# Patient Record
Sex: FEMALE | Race: OTHER | Employment: OTHER | URBAN - METROPOLITAN AREA
[De-identification: names, ages, dates, MRNs, and addresses within clinical notes are randomized per-mention and may not be internally consistent; named-entity substitution may affect disease eponyms.]

---

## 2017-04-05 ENCOUNTER — HOSPITAL ENCOUNTER (OUTPATIENT)
Dept: RADIOLOGY | Facility: MEDICAL CENTER | Age: 67
End: 2017-04-05
Attending: OBSTETRICS & GYNECOLOGY
Payer: COMMERCIAL

## 2017-04-05 DIAGNOSIS — Z13.9 SCREENING: ICD-10-CM

## 2017-04-05 PROCEDURE — G0202 SCR MAMMO BI INCL CAD: HCPCS

## 2017-05-04 ENCOUNTER — TELEPHONE (OUTPATIENT)
Dept: RADIOLOGY | Facility: MEDICAL CENTER | Age: 67
End: 2017-05-04

## 2017-05-04 ENCOUNTER — HOSPITAL ENCOUNTER (OUTPATIENT)
Dept: RADIOLOGY | Facility: MEDICAL CENTER | Age: 67
End: 2017-05-04
Attending: OBSTETRICS & GYNECOLOGY
Payer: COMMERCIAL

## 2017-05-04 DIAGNOSIS — R92.8 ABNORMAL MAMMOGRAM OF RIGHT BREAST: ICD-10-CM

## 2017-05-04 PROCEDURE — 76642 ULTRASOUND BREAST LIMITED: CPT | Mod: RT

## 2017-05-04 PROCEDURE — G0206 DX MAMMO INCL CAD UNI: HCPCS | Mod: RT

## 2017-05-04 NOTE — TELEPHONE ENCOUNTER
Spoke to Erika from [facility]'s office who stated she authorizes a breast ultrasound order and will fax an updated order/jls

## 2018-04-10 ENCOUNTER — HOSPITAL ENCOUNTER (OUTPATIENT)
Dept: RADIOLOGY | Facility: MEDICAL CENTER | Age: 68
End: 2018-04-10
Attending: OBSTETRICS & GYNECOLOGY
Payer: MEDICARE

## 2018-04-10 DIAGNOSIS — Z01.419 ENCOUNTER FOR GYNECOLOGICAL EXAMINATION WITHOUT ABNORMAL FINDING: ICD-10-CM

## 2018-04-10 DIAGNOSIS — Z12.31 VISIT FOR SCREENING MAMMOGRAM: ICD-10-CM

## 2018-04-10 PROCEDURE — 77063 BREAST TOMOSYNTHESIS BI: CPT

## 2018-04-10 PROCEDURE — 77080 DXA BONE DENSITY AXIAL: CPT

## 2019-04-12 ENCOUNTER — HOSPITAL ENCOUNTER (OUTPATIENT)
Dept: RADIOLOGY | Facility: MEDICAL CENTER | Age: 69
End: 2019-04-12
Attending: OBSTETRICS & GYNECOLOGY
Payer: MEDICARE

## 2019-04-12 DIAGNOSIS — Z12.31 VISIT FOR SCREENING MAMMOGRAM: ICD-10-CM

## 2019-04-12 PROCEDURE — 77063 BREAST TOMOSYNTHESIS BI: CPT

## 2019-10-20 ENCOUNTER — OFFICE VISIT (OUTPATIENT)
Dept: URGENT CARE | Facility: PHYSICIAN GROUP | Age: 69
End: 2019-10-20
Payer: MEDICARE

## 2019-10-20 VITALS
HEIGHT: 65 IN | SYSTOLIC BLOOD PRESSURE: 138 MMHG | OXYGEN SATURATION: 96 % | WEIGHT: 142 LBS | HEART RATE: 88 BPM | BODY MASS INDEX: 23.66 KG/M2 | TEMPERATURE: 97 F | RESPIRATION RATE: 18 BRPM | DIASTOLIC BLOOD PRESSURE: 80 MMHG

## 2019-10-20 DIAGNOSIS — R05.9 COUGH: ICD-10-CM

## 2019-10-20 DIAGNOSIS — J98.8 RTI (RESPIRATORY TRACT INFECTION): ICD-10-CM

## 2019-10-20 PROCEDURE — 99203 OFFICE O/P NEW LOW 30 MIN: CPT | Performed by: PHYSICIAN ASSISTANT

## 2019-10-20 RX ORDER — BLOOD SUGAR DIAGNOSTIC, DRUM
STRIP MISCELLANEOUS
Refills: 3 | COMMUNITY
Start: 2019-09-11

## 2019-10-20 RX ORDER — LANCETS
EACH MISCELLANEOUS
Refills: 3 | COMMUNITY
Start: 2019-09-28

## 2019-10-20 RX ORDER — ASPIRIN 81 MG/1
81 TABLET, CHEWABLE ORAL DAILY
COMMUNITY

## 2019-10-20 RX ORDER — LEVOTHYROXINE SODIUM 112 MCG
TABLET ORAL
COMMUNITY
Start: 2019-08-19

## 2019-10-20 RX ORDER — ALENDRONATE SODIUM 70 MG/1
TABLET, EFFERVESCENT ORAL
COMMUNITY
Start: 2019-10-01

## 2019-10-20 RX ORDER — ROSUVASTATIN CALCIUM 5 MG/1
5 TABLET, COATED ORAL
Refills: 3 | COMMUNITY
Start: 2019-09-28

## 2019-10-20 RX ORDER — DOXYCYCLINE HYCLATE 100 MG
100 TABLET ORAL 2 TIMES DAILY
Qty: 14 TAB | Refills: 0 | Status: SHIPPED | OUTPATIENT
Start: 2019-10-20 | End: 2019-10-27

## 2019-10-20 RX ORDER — BENZONATATE 100 MG/1
100 CAPSULE ORAL 3 TIMES DAILY PRN
Qty: 30 CAP | Refills: 0 | Status: SHIPPED | OUTPATIENT
Start: 2019-10-20 | End: 2023-08-19

## 2019-10-20 RX ORDER — GLIMEPIRIDE 4 MG/1
4 TABLET ORAL EVERY MORNING
Refills: 3 | COMMUNITY
Start: 2019-09-28

## 2019-10-20 RX ORDER — LISINOPRIL 10 MG/1
10 TABLET ORAL
Refills: 3 | COMMUNITY
Start: 2019-09-14

## 2019-10-20 ASSESSMENT — ENCOUNTER SYMPTOMS
WHEEZING: 0
NAUSEA: 0
FEVER: 0
HEMOPTYSIS: 0
SORE THROAT: 1
VOMITING: 0
SHORTNESS OF BREATH: 0
CHILLS: 0
COUGH: 1

## 2019-10-20 ASSESSMENT — COPD QUESTIONNAIRES: COPD: 0

## 2019-10-20 NOTE — PROGRESS NOTES
Subjective:   Arin Lee is a 69 y.o. female who presents for Cough (runny nose, L ear [id number] )        Gets similar sx this time every year. Typically sees PCP and requires abx therapy. Unable to be seen by PCPdue to scheduling conflict.    Cough   This is a new problem. The current episode started in the past 7 days. The problem has been gradually worsening. The problem occurs constantly. The cough is non-productive. Associated symptoms include ear congestion, nasal congestion, postnasal drip and a sore throat. Pertinent negatives include no chills, fever, hemoptysis, shortness of breath or wheezing. Exacerbated by: nighttime. She has tried OTC cough suppressant (tylenol, robitussin) for the symptoms. Her past medical history is significant for bronchitis. There is no history of asthma, COPD, emphysema, environmental allergies or pneumonia.     Review of Systems   Constitutional: Negative for chills and fever.   HENT: Positive for postnasal drip and sore throat.    Respiratory: Positive for cough. Negative for hemoptysis, shortness of breath and wheezing.    Gastrointestinal: Negative for nausea and vomiting.   Endo/Heme/Allergies: Negative for environmental allergies.       PMH:  has no past medical history of Breast cancer (HCC).  MEDS:   Current Outpatient Medications:   •  lisinopril (PRINIVIL) 10 MG Tab, Take 10 mg by mouth every day., Disp: , Rfl: 3  •  glimepiride (AMARYL) 4 MG Tab, Take 4 mg by mouth every morning., Disp: , Rfl: 3  •  metformin (GLUCOPHAGE) 1000 MG tablet, Take 1,000 mg by mouth., Disp: , Rfl: 11  •  rosuvastatin (CRESTOR) 5 MG Tab, Take 5 mg by mouth every day., Disp: , Rfl: 3  •  SYNTHROID 112 MCG Tab, , Disp: , Rfl:   •  BINOSTO 70 MG Effer Tab, , Disp: , Rfl:   •  ACCU-CHEK FASTCLIX LANCETS Misc, USE DAILY TO CHECK BLOOD SUGARS. E11.9, Disp: , Rfl: 3  •  NOVOTWIST 32G X 5 MM Misc, USE DAILY WITH VICTOZA, Disp: , Rfl: 6  •  ACCU-CHEK COMPACT PLUS strip, TEST BLOOD  "SUGAR ONCE DAILY E11.9, Disp: , Rfl: 3  •  aspirin (ASA) 81 MG Chew Tab chewable tablet, Take 81 mg by mouth every day., Disp: , Rfl:   •  liraglutide (VICTOZA) 18 MG/3ML Solution Pen-injector, Inject 0.6 mg as instructed every day., Disp: , Rfl:   •  vitamin D (CHOLECALCIFEROL) 1000 UNIT Tab, Take 1,000 Units by mouth every day., Disp: , Rfl:   •  Calcium Carbonate-Vit D-Min (CALCIUM 1200 PO), Take  by mouth., Disp: , Rfl:   •  doxycycline (VIBRAMYCIN) 100 MG Tab, Take 1 Tab by mouth 2 times a day for 7 days., Disp: 14 Tab, Rfl: 0  •  benzonatate (TESSALON) 100 MG Cap, Take 1 Cap by mouth 3 times a day as needed., Disp: 30 Cap, Rfl: 0  •  naproxen (NAPROSYN) 250 MG TABS, Take 1 Tab by mouth 2 times daily with meals as needed. (Patient not taking: Reported on 10/20/2019), Disp: 30 Each, Rfl: 0  ALLERGIES: No Known Allergies  SURGHX: History reviewed. No pertinent surgical history.  SOCHX:  reports that she has never smoked. She has never used smokeless tobacco. She reports that she drank alcohol.  FH: Family history was reviewed, no pertinent findings to report   Objective:   /80   Pulse 88   Temp 36.1 °C (97 °F) (Temporal)   Resp 18   Ht 1.651 m (5' 5\")   Wt 64.4 kg (142 lb)   SpO2 96%   BMI 23.63 kg/m²   Physical Exam   Constitutional: She is oriented to person, place, and time. She appears well-developed and well-nourished.  Non-toxic appearance. No distress.   HENT:   Head: Normocephalic and atraumatic.   Right Ear: Hearing, external ear and ear canal normal. Tympanic membrane is injected (mild).   Left Ear: Hearing, external ear and ear canal normal.   Nose: Mucosal edema and rhinorrhea present. Right sinus exhibits no maxillary sinus tenderness. Left sinus exhibits no maxillary sinus tenderness.   Mouth/Throat: Uvula is midline, oropharynx is clear and moist and mucous membranes are normal.   Complete left sided cerumen impaction. Significant nasal congestion. Frequent dry cough.   Eyes: " Conjunctivae and lids are normal.   Neck: Neck supple.   Cardiovascular: Normal rate, regular rhythm, S1 normal, S2 normal and normal heart sounds. Exam reveals no gallop and no friction rub.   No murmur heard.  Pulmonary/Chest: Effort normal and breath sounds normal. No respiratory distress. She has no decreased breath sounds. She has no wheezes. She has no rhonchi. She has no rales.   Abdominal: Normal appearance.   Musculoskeletal:   Normal range of motion. Exhibits no edema and no tenderness.    Neurological: She is alert and oriented to person, place, and time. No cranial nerve deficit or sensory deficit.   Skin: Skin is warm, dry and intact. Capillary refill takes less than 2 seconds.   Psychiatric: She has a normal mood and affect. Her speech is normal and behavior is normal. Judgment and thought content normal. Cognition and memory are normal.   Vitals reviewed.        Assessment/Plan:   1. RTI (respiratory tract infection)  - doxycycline (VIBRAMYCIN) 100 MG Tab; Take 1 Tab by mouth 2 times a day for 7 days.  Dispense: 14 Tab; Refill: 0  - benzonatate (TESSALON) 100 MG Cap; Take 1 Cap by mouth 3 times a day as needed.  Dispense: 30 Cap; Refill: 0    2. Cough  - doxycycline (VIBRAMYCIN) 100 MG Tab; Take 1 Tab by mouth 2 times a day for 7 days.  Dispense: 14 Tab; Refill: 0  - benzonatate (TESSALON) 100 MG Cap; Take 1 Cap by mouth 3 times a day as needed.  Dispense: 30 Cap; Refill: 0    Other orders  - lisinopril (PRINIVIL) 10 MG Tab; Take 10 mg by mouth every day.; Refill: 3  - glimepiride (AMARYL) 4 MG Tab; Take 4 mg by mouth every morning.; Refill: 3  - metformin (GLUCOPHAGE) 1000 MG tablet; Take 1,000 mg by mouth.; Refill: 11  - rosuvastatin (CRESTOR) 5 MG Tab; Take 5 mg by mouth every day.; Refill: 3  - SYNTHROID 112 MCG Tab  - BINOSTO 70 MG Effer Tab  - ACCU-CHEK FASTCLIX LANCETS Misc; USE DAILY TO CHECK BLOOD SUGARS. E11.9; Refill: 3  - NOVOTWIST 32G X 5 MM Misc; USE DAILY WITH VICTOZA; Refill: 6  -  ACCU-CHEK COMPACT PLUS strip; TEST BLOOD SUGAR ONCE DAILY E11.9; Refill: 3  - aspirin (ASA) 81 MG Chew Tab chewable tablet; Take 81 mg by mouth every day.  - liraglutide (VICTOZA) 18 MG/3ML Solution Pen-injector; Inject 0.6 mg as instructed every day.  - vitamin D (CHOLECALCIFEROL) 1000 UNIT Tab; Take 1,000 Units by mouth every day.  - Calcium Carbonate-Vit D-Min (CALCIUM 1200 PO); Take  by mouth.    Pt instructed to complete full course of medication despite symptomatic improvement. Pt to take med with meals to alleviate GI upset. Pt to take a probiotic or eat Jocelin’s yogurt/ kefir while taking the medication.    Flonase 1 squirt in each nostril, as instructed in clinic, once a day.  Use nasal saline TID to promote drainage.   Salt water gurgles to soothe sore throat.  Ayr saline gel to moisturize nasal passage and prevent bleeding.  Tessalon perles prn cough.  If you fail to improve in 3-5 days or symptoms worsen/new symptoms develop, RTC for reevaluation    Differential diagnosis, natural history, supportive care, and indications for immediate follow-up discussed.

## 2020-05-30 ENCOUNTER — HOSPITAL ENCOUNTER (OUTPATIENT)
Dept: RADIOLOGY | Facility: MEDICAL CENTER | Age: 70
End: 2020-05-30
Attending: OBSTETRICS & GYNECOLOGY
Payer: MEDICARE

## 2020-05-30 DIAGNOSIS — Z12.31 VISIT FOR SCREENING MAMMOGRAM: ICD-10-CM

## 2020-05-30 PROCEDURE — 77067 SCR MAMMO BI INCL CAD: CPT

## 2021-03-03 DIAGNOSIS — Z23 NEED FOR VACCINATION: ICD-10-CM

## 2021-06-01 ENCOUNTER — HOSPITAL ENCOUNTER (OUTPATIENT)
Dept: RADIOLOGY | Facility: MEDICAL CENTER | Age: 71
End: 2021-06-01
Attending: OBSTETRICS & GYNECOLOGY
Payer: MEDICARE

## 2021-06-01 DIAGNOSIS — R92.30 DENSE BREAST: ICD-10-CM

## 2021-06-01 DIAGNOSIS — R92.2 DENSE BREAST: ICD-10-CM

## 2021-06-01 DIAGNOSIS — Z12.31 ENCOUNTER FOR SCREENING MAMMOGRAM FOR BREAST CANCER: ICD-10-CM

## 2021-06-01 PROCEDURE — 77063 BREAST TOMOSYNTHESIS BI: CPT

## 2022-06-08 ENCOUNTER — HOSPITAL ENCOUNTER (OUTPATIENT)
Dept: RADIOLOGY | Facility: MEDICAL CENTER | Age: 72
End: 2022-06-08
Attending: INTERNAL MEDICINE
Payer: MEDICARE

## 2022-06-08 DIAGNOSIS — Z12.31 VISIT FOR SCREENING MAMMOGRAM: ICD-10-CM

## 2022-06-08 PROCEDURE — 77063 BREAST TOMOSYNTHESIS BI: CPT

## 2022-11-24 ENCOUNTER — HOSPITAL ENCOUNTER (OUTPATIENT)
Facility: MEDICAL CENTER | Age: 72
End: 2022-11-24
Attending: PHYSICIAN ASSISTANT
Payer: MEDICARE

## 2022-11-24 ENCOUNTER — OFFICE VISIT (OUTPATIENT)
Dept: URGENT CARE | Facility: CLINIC | Age: 72
End: 2022-11-24
Payer: MEDICARE

## 2022-11-24 VITALS
HEART RATE: 92 BPM | WEIGHT: 133.9 LBS | DIASTOLIC BLOOD PRESSURE: 52 MMHG | OXYGEN SATURATION: 96 % | BODY MASS INDEX: 22.31 KG/M2 | TEMPERATURE: 97.8 F | SYSTOLIC BLOOD PRESSURE: 100 MMHG | RESPIRATION RATE: 16 BRPM | HEIGHT: 65 IN

## 2022-11-24 DIAGNOSIS — N30.01 ACUTE CYSTITIS WITH HEMATURIA: ICD-10-CM

## 2022-11-24 LAB
APPEARANCE UR: CLEAR
BILIRUB UR STRIP-MCNC: NEGATIVE MG/DL
COLOR UR AUTO: NORMAL
GLUCOSE UR STRIP.AUTO-MCNC: 500 MG/DL
KETONES UR STRIP.AUTO-MCNC: NORMAL MG/DL
LEUKOCYTE ESTERASE UR QL STRIP.AUTO: NORMAL
NITRITE UR QL STRIP.AUTO: POSITIVE
PH UR STRIP.AUTO: 5.5 [PH] (ref 5–8)
PROT UR QL STRIP: NEGATIVE MG/DL
RBC UR QL AUTO: NORMAL
SP GR UR STRIP.AUTO: 1
UROBILINOGEN UR STRIP-MCNC: 0.2 MG/DL

## 2022-11-24 PROCEDURE — 87077 CULTURE AEROBIC IDENTIFY: CPT

## 2022-11-24 PROCEDURE — 81002 URINALYSIS NONAUTO W/O SCOPE: CPT | Performed by: PHYSICIAN ASSISTANT

## 2022-11-24 PROCEDURE — 99213 OFFICE O/P EST LOW 20 MIN: CPT | Performed by: PHYSICIAN ASSISTANT

## 2022-11-24 PROCEDURE — 87086 URINE CULTURE/COLONY COUNT: CPT

## 2022-11-24 PROCEDURE — 87186 SC STD MICRODIL/AGAR DIL: CPT

## 2022-11-24 RX ORDER — CEFDINIR 300 MG/1
300 CAPSULE ORAL 2 TIMES DAILY
Qty: 14 CAPSULE | Refills: 0 | Status: SHIPPED | OUTPATIENT
Start: 2022-11-24 | End: 2022-12-01

## 2022-11-24 ASSESSMENT — ENCOUNTER SYMPTOMS
CONSTIPATION: 0
VOMITING: 0
MYALGIAS: 0
NAUSEA: 0
SHORTNESS OF BREATH: 0
FEVER: 0
SORE THROAT: 0
ABDOMINAL PAIN: 0
COUGH: 0
BACK PAIN: 1
DIARRHEA: 0
HEADACHES: 0
EYE PAIN: 0
FLANK PAIN: 0
CHILLS: 0

## 2022-11-24 NOTE — PROGRESS NOTES
"Subjective:   Arin Lee is a 72 y.o. female who presents for UTI (Pt has blood in urine, frequent urination, painful urination, lower back pain, abdominal pain x this morning)      72-year-old female presents with a cute onset of dysuria, frequency and urgency.  She has had some suprapubic cramping.  She denies flank pain and has some vague lower abdominal and lower back discomfort which is actually been ongoing for several weeks.  No fevers or chills.  She denies nausea or vomiting.    Review of Systems   Constitutional:  Negative for chills and fever.   HENT:  Negative for congestion, ear pain and sore throat.    Eyes:  Negative for pain.   Respiratory:  Negative for cough and shortness of breath.    Cardiovascular:  Negative for chest pain.   Gastrointestinal:  Negative for abdominal pain, constipation, diarrhea, nausea and vomiting.   Genitourinary:  Positive for dysuria, frequency, hematuria and urgency. Negative for flank pain.   Musculoskeletal:  Positive for back pain. Negative for myalgias.   Skin:  Negative for rash.   Neurological:  Negative for headaches.     Medications, Allergies, and current problem list reviewed today in Epic.     Objective:     /52 (BP Location: Left arm, Patient Position: Sitting, BP Cuff Size: Adult)   Pulse 92   Temp 36.6 °C (97.8 °F) (Temporal)   Resp 16   Ht 1.651 m (5' 5\")   Wt 60.7 kg (133 lb 14.4 oz)   SpO2 96%     Physical Exam  Vitals reviewed.   Constitutional:       Appearance: Normal appearance.   HENT:      Head: Normocephalic and atraumatic.      Right Ear: External ear normal.      Left Ear: External ear normal.      Nose: Nose normal.      Mouth/Throat:      Mouth: Mucous membranes are moist.   Eyes:      Conjunctiva/sclera: Conjunctivae normal.   Cardiovascular:      Rate and Rhythm: Normal rate.   Pulmonary:      Effort: Pulmonary effort is normal.   Abdominal:      Tenderness: There is no right CVA tenderness or left CVA tenderness. "   Skin:     General: Skin is warm and dry.      Capillary Refill: Capillary refill takes less than 2 seconds.   Neurological:      Mental Status: She is alert and oriented to person, place, and time.       Lab Results/POC Test Results   Results for orders placed or performed in visit on 11/24/22   POCT Urinalysis   Result Value Ref Range    POC Color light yellow Negative    POC Appearance clear Negative    POC Leukocyte Esterase trace Negative    POC Nitrites positive Negative    POC Urobiligen 0.2 Negative (0.2) mg/dL    POC Protein negative Negative mg/dL    POC Urine PH 5.5 5.0 - 8.0    POC Blood large Negative    POC Specific Gravity 1.005 <1.005 - >1.030    POC Ketones trace Negative mg/dL    POC Bilirubin negative Negative mg/dL    POC Glucose 500 Negative mg/dL             Assessment/Plan:     Diagnosis and associated orders:     1. Acute cystitis with hematuria  POCT Urinalysis    URINE CULTURE(NEW)    cefdinir (OMNICEF) 300 MG Cap         Comments/MDM:     Urinalysis consistent with lower urinary tract infection.  She has no CVA tenderness, no true flank pain and I see no constitutional symptoms to warrant concern of pyelonephritis.  We will treat her empirically with cefdinir and send out urine culture.  Encourage hydration and follow-up if not showing improvement and ER evaluation if worsening  Additionally I have considered several alternative diagnosis for dysuria including but not limited to urethritis, skin condition (irritant contact dermatitis, herpes), vaginitis, interstitial cystitis, and pyelonephritis.           Differential diagnosis, natural history, supportive care, and indications for immediate follow-up discussed.    Advised the patient to follow-up with the primary care physician for recheck, reevaluation, and consideration of further management.    Please note that this dictation was created using voice recognition software. I have made a reasonable attempt to correct obvious errors, but  I expect that there are errors of grammar and possibly content that I did not discover before finalizing the note.    This note was electronically signed by Nahun Sanders PA-C

## 2022-11-25 DIAGNOSIS — N30.01 ACUTE CYSTITIS WITH HEMATURIA: ICD-10-CM

## 2022-11-27 LAB
BACTERIA UR CULT: ABNORMAL
BACTERIA UR CULT: ABNORMAL
SIGNIFICANT IND 70042: ABNORMAL
SITE SITE: ABNORMAL
SOURCE SOURCE: ABNORMAL

## 2023-05-28 ENCOUNTER — OFFICE VISIT (OUTPATIENT)
Dept: URGENT CARE | Facility: PHYSICIAN GROUP | Age: 73
End: 2023-05-28
Payer: MEDICARE

## 2023-05-28 VITALS
DIASTOLIC BLOOD PRESSURE: 60 MMHG | SYSTOLIC BLOOD PRESSURE: 114 MMHG | WEIGHT: 133 LBS | HEIGHT: 64 IN | BODY MASS INDEX: 22.71 KG/M2 | OXYGEN SATURATION: 96 % | TEMPERATURE: 98.1 F | HEART RATE: 90 BPM | RESPIRATION RATE: 18 BRPM

## 2023-05-28 DIAGNOSIS — R05.1 ACUTE COUGH: ICD-10-CM

## 2023-05-28 DIAGNOSIS — J30.2 SEASONAL ALLERGIES: ICD-10-CM

## 2023-05-28 PROCEDURE — 99213 OFFICE O/P EST LOW 20 MIN: CPT | Performed by: NURSE PRACTITIONER

## 2023-05-28 PROCEDURE — 3078F DIAST BP <80 MM HG: CPT | Performed by: NURSE PRACTITIONER

## 2023-05-28 PROCEDURE — 3074F SYST BP LT 130 MM HG: CPT | Performed by: NURSE PRACTITIONER

## 2023-05-28 RX ORDER — ALBUTEROL SULFATE 90 UG/1
2 AEROSOL, METERED RESPIRATORY (INHALATION) EVERY 6 HOURS PRN
Qty: 8.5 G | Refills: 0 | Status: SHIPPED | OUTPATIENT
Start: 2023-05-28

## 2023-05-28 RX ORDER — BENZONATATE 100 MG/1
100 CAPSULE ORAL 3 TIMES DAILY PRN
Qty: 60 CAPSULE | Refills: 0 | Status: SHIPPED | OUTPATIENT
Start: 2023-05-28 | End: 2023-08-19

## 2023-05-28 RX ORDER — CANAGLIFLOZIN 100 MG/1
TABLET, FILM COATED ORAL
COMMUNITY

## 2023-05-28 RX ORDER — SEMAGLUTIDE 0.68 MG/ML
0.5 INJECTION, SOLUTION SUBCUTANEOUS
COMMUNITY

## 2023-05-28 RX ORDER — DAPAGLIFLOZIN 10 MG/1
1 TABLET, FILM COATED ORAL DAILY
COMMUNITY
Start: 2023-05-16

## 2023-05-28 ASSESSMENT — ENCOUNTER SYMPTOMS
COUGH: 1
SHORTNESS OF BREATH: 1

## 2023-05-28 NOTE — PROGRESS NOTES
Subjective     Arin Lee is a 72 y.o. female who presents with Cough (Intermittent dry coughing fits/Throat feels dry/3 days) and Laryngitis (Onset 3 days)            Cough  This is a new problem. Episode onset: pt reports new onset of cough and runny nose that started 3 days ago. no fevers. did not test for covid. cough is mostly dry. cough keeps her up at night. The cough is Non-productive. Associated symptoms include shortness of breath (with coughing fits). Risk factors: non smoker. She has tried OTC cough suppressant (tylenol and robitussin) for the symptoms. The treatment provided mild relief. There is no history of asthma.       Review of Systems   Respiratory:  Positive for cough and shortness of breath (with coughing fits).    All other systems reviewed and are negative.         History reviewed. No pertinent past medical history. History reviewed. No pertinent surgical history.   Social History     Socioeconomic History    Marital status:      Spouse name: Not on file    Number of children: Not on file    Years of education: Not on file    Highest education level: Not on file   Occupational History    Not on file   Tobacco Use    Smoking status: Never    Smokeless tobacco: Never   Vaping Use    Vaping Use: Never used   Substance and Sexual Activity    Alcohol use: Not Currently    Drug use: Not Currently    Sexual activity: Not on file   Other Topics Concern    Not on file   Social History Narrative    Not on file     Social Determinants of Health     Financial Resource Strain: Not on file   Food Insecurity: Not on file   Transportation Needs: Not on file   Physical Activity: Not on file   Stress: Not on file   Social Connections: Not on file   Intimate Partner Violence: Not on file   Housing Stability: Not on file         Objective     /60 (BP Location: Right arm, Patient Position: Sitting, BP Cuff Size: Adult long)   Pulse 90   Temp 36.7 °C (98.1 °F) (Temporal)   Resp 18    "Ht 1.626 m (5' 4\")   Wt 60.3 kg (133 lb)   SpO2 96%   BMI 22.83 kg/m²      Physical Exam  Vitals and nursing note reviewed.   Constitutional:       Appearance: Normal appearance. She is normal weight.   HENT:      Head: Normocephalic and atraumatic.      Right Ear: Tympanic membrane and external ear normal.      Left Ear: Tympanic membrane and external ear normal.      Nose: Nose normal.      Mouth/Throat:      Mouth: Mucous membranes are moist.      Pharynx: Oropharynx is clear.   Eyes:      Extraocular Movements: Extraocular movements intact.      Pupils: Pupils are equal, round, and reactive to light.   Cardiovascular:      Rate and Rhythm: Normal rate and regular rhythm.      Heart sounds: Normal heart sounds.   Pulmonary:      Effort: Pulmonary effort is normal.      Breath sounds: Normal breath sounds.   Musculoskeletal:         General: Normal range of motion.      Cervical back: Normal range of motion.   Skin:     General: Skin is warm and dry.      Capillary Refill: Capillary refill takes less than 2 seconds.   Neurological:      General: No focal deficit present.      Mental Status: She is alert and oriented to person, place, and time. Mental status is at baseline.   Psychiatric:         Mood and Affect: Mood normal.         Speech: Speech normal.         Thought Content: Thought content normal.         Judgment: Judgment normal.                             Assessment & Plan        1. Acute cough  - albuterol 108 (90 Base) MCG/ACT Aero Soln inhalation aerosol; Inhale 2 Puffs every 6 hours as needed for Shortness of Breath (cough).  Dispense: 8.5 g; Refill: 0  - benzonatate (TESSALON) 100 MG Cap; Take 1 Capsule by mouth 3 times a day as needed for Cough.  Dispense: 60 Capsule; Refill: 0    2. Seasonal allergies    Advised pt to take claritin daily   OTC flonase BID for one week  Try to avoid environmental allergens  Humidifier at home to help with throat dryness  Increase water intake  Supportive care, " differential diagnoses, and indications for immediate follow-up discussed with patient.    Pathogenesis of diagnosis discussed including typical length and natural progression.    Instructed to return to UC or nearest emergency department if symptoms fail to improve, for any change in condition, further concerns, or new concerning symptoms.  Patient states understanding of the plan of care and discharge instructions.

## 2023-06-02 ENCOUNTER — OFFICE VISIT (OUTPATIENT)
Dept: URGENT CARE | Facility: CLINIC | Age: 73
End: 2023-06-02
Payer: MEDICARE

## 2023-06-02 ENCOUNTER — APPOINTMENT (OUTPATIENT)
Dept: RADIOLOGY | Facility: IMAGING CENTER | Age: 73
End: 2023-06-02
Attending: REGISTERED NURSE
Payer: MEDICARE

## 2023-06-02 DIAGNOSIS — R05.1 ACUTE COUGH: ICD-10-CM

## 2023-06-02 DIAGNOSIS — J90 PLEURAL EFFUSION: ICD-10-CM

## 2023-06-02 PROCEDURE — 71046 X-RAY EXAM CHEST 2 VIEWS: CPT | Mod: TC | Performed by: NURSE PRACTITIONER

## 2023-06-02 PROCEDURE — 99214 OFFICE O/P EST MOD 30 MIN: CPT | Performed by: REGISTERED NURSE

## 2023-06-02 NOTE — PROGRESS NOTES
"Subjective:   Arin Lee is a 72 y.o. female who presents for Cough (Cough not improving since being seen on 5/28, sore throat, cough, chills, runny nose, headache, congestion )      HPI:  Coming back for reevaluation of cough.  Was evaluated in clinic on 5/28/2023 and started on benzonatate and albuterol.  Cough is still intermittent and dry, it is nonproductive.  There are no fevers, body aches, chills, sore throat at this time, still some slight congestion/runny nose.  No underlying history of COPD or asthma.  Overall feels okay but just has a lingering cough.  Is also using Zyrtec and Flonase in conjunction with inhaler and benzonatate.  Denies history of pneumonia.  No recent hospitalizations or antibiotics.    ROS    Medications:    Accu-Chek Compact Plus Strp  Accu-Chek FastClix Lancets Misc  albuterol Aers  aspirin Chew  benzonatate Caps  Binosto Tbef  CALCIUM 1200 PO  Farxiga Tabs  glimepiride Tabs  Invokana Tabs  liraglutide Sopn  lisinopril Tabs  metformin  naproxen Tabs  NovoTwist Misc  Ozempic (0.25 or 0.5 MG/DOSE) Sopn  rosuvastatin Tabs  Synthroid Tabs  vitamin D Tabs    Allergies: Patient has no known allergies.    Problem List: Arin Lee does not have a problem list on file.    Surgical History:  No past surgical history on file.    Past Social Hx: Arin Lee  reports that she has never smoked. She has never used smokeless tobacco. She reports that she does not currently use alcohol. She reports that she does not currently use drugs.     Past Family Hx:  Arin Lee family history includes Breast Cancer in her sister; Cancer in her sister.     Problem list, medications, and allergies reviewed by myself today in Epic.     Objective:     BP (P) 118/62   Pulse (P) 87   Temp (P) 36.3 °C (97.3 °F) (Temporal)   Resp (P) 18   Ht (P) 1.626 m (5' 4\")   Wt (P) 60.3 kg (133 lb)   SpO2 (P) 92%   BMI (P) 22.83 kg/m²     Physical Exam  Vitals and nursing note " reviewed.   Constitutional:       General: She is not in acute distress.     Appearance: Normal appearance. She is well-developed. She is not ill-appearing, toxic-appearing or diaphoretic.   HENT:      Head: Normocephalic and atraumatic.      Right Ear: Tympanic membrane, ear canal and external ear normal.      Left Ear: Tympanic membrane, ear canal and external ear normal.      Nose: Nose normal. No congestion or rhinorrhea.      Mouth/Throat:      Mouth: Mucous membranes are moist.      Pharynx: No oropharyngeal exudate or posterior oropharyngeal erythema.   Eyes:      General: No scleral icterus.        Right eye: No discharge.         Left eye: No discharge.      Conjunctiva/sclera: Conjunctivae normal.   Cardiovascular:      Rate and Rhythm: Normal rate and regular rhythm.      Pulses: Normal pulses.      Heart sounds: Normal heart sounds.   Pulmonary:      Effort: Pulmonary effort is normal. No respiratory distress.      Breath sounds: Normal breath sounds. No wheezing, rhonchi or rales.   Musculoskeletal:      Cervical back: Normal range of motion and neck supple.      Right lower leg: No edema.      Left lower leg: No edema.   Lymphadenopathy:      Cervical: No cervical adenopathy.   Skin:     General: Skin is warm and dry.   Neurological:      General: No focal deficit present.      Mental Status: She is alert and oriented to person, place, and time. Mental status is at baseline.   Psychiatric:         Mood and Affect: Mood normal.         Behavior: Behavior normal.         Thought Content: Thought content normal.         Judgment: Judgment normal.         RADIOLOGY RESULTS   DX-CHEST-2 VIEWS    Result Date: 6/2/2023 6/2/2023 9:52 AM HISTORY/REASON FOR EXAM:  Cough. TECHNIQUE/EXAM DESCRIPTION AND NUMBER OF VIEWS: Two views of the chest. COMPARISON:  None. FINDINGS: The heart is normal in size. No pulmonary infiltrates or consolidations are noted. Bilateral lower lung nodular opacities favoring nipple  shadow. Calcifications project at the peripheral right lower and midlung without clear correlate on lateral view. Trace right pleural effusion. No pneumothorax seen.     1.  Trace right pleural effusion. No overt failure or pneumonia. 2.  Calcifications project at the peripheral right lower and midlung without clear correlate on lateral view. These could represent pleural complications. 3.  Bilateral lower lung nodular opacities favoring nipple shadow. If there is clinical concern, exam can be repeated with nipple markers.             Assessment/Plan:     Diagnosis and associated orders:     1. Acute cough  DX-CHEST-2 VIEWS         Comments/MDM:     Vital signs within normal limits, nontoxic appearance  Continuation of cough from previous visit, it is dry and nonproductive  Exam overall is unremarkable, no increased work of breathing, no adventitious lung sounds, no lower extremity edema  X-ray notes a trace right-sided pleural effusion, this needs to be monitored and re imaged in the next 2 to 4 weeks, sooner if developing new symptoms  Discussed red flag signs and symptoms that require immediate evaluation  ER precautions  Follow up with primary care provider          Differential diagnosis, natural history, supportive care, and indications for immediate follow-up discussed.    Return to clinic or go to ED if symptoms worsen or persist. Indications for ED discussed at length. Patient/Parent/Guardian voices understanding. Follow-up with your primary care provider in 3-5 days. Red flag symptoms discussed. All side effects of medication discussed including allergic response, GI upset, tendon injury, rash, sedation etc.    I personally reviewed prior external notes and test results pertinent to today's visit as well as additional imaging and testing completed in clinic today.     Please note that this dictation was created using voice recognition software. I have made every reasonable attempt to correct obvious  errors, but I expect that there are errors of grammar and possibly content that I did not discover before finalizing the note.    This note was electronically signed by EMILY Langford

## 2023-06-15 ENCOUNTER — HOSPITAL ENCOUNTER (OUTPATIENT)
Dept: RADIOLOGY | Facility: MEDICAL CENTER | Age: 73
End: 2023-06-15
Attending: OBSTETRICS & GYNECOLOGY
Payer: MEDICARE

## 2023-06-15 DIAGNOSIS — Z12.31 VISIT FOR SCREENING MAMMOGRAM: ICD-10-CM

## 2023-06-15 PROCEDURE — 77063 BREAST TOMOSYNTHESIS BI: CPT

## 2023-06-20 ENCOUNTER — HOSPITAL ENCOUNTER (OUTPATIENT)
Dept: RADIOLOGY | Facility: MEDICAL CENTER | Age: 73
End: 2023-06-20
Attending: OBSTETRICS & GYNECOLOGY
Payer: MEDICARE

## 2023-06-20 DIAGNOSIS — Z13.820 ENCOUNTER FOR SCREENING FOR OSTEOPOROSIS: ICD-10-CM

## 2023-06-20 DIAGNOSIS — M81.0 AGE RELATED OSTEOPOROSIS, UNSPECIFIED PATHOLOGICAL FRACTURE PRESENCE: ICD-10-CM

## 2023-06-20 PROCEDURE — 77080 DXA BONE DENSITY AXIAL: CPT

## 2023-08-19 ENCOUNTER — OFFICE VISIT (OUTPATIENT)
Dept: URGENT CARE | Facility: CLINIC | Age: 73
End: 2023-08-19
Payer: MEDICARE

## 2023-08-19 VITALS
BODY MASS INDEX: 21 KG/M2 | RESPIRATION RATE: 16 BRPM | DIASTOLIC BLOOD PRESSURE: 70 MMHG | HEIGHT: 64 IN | SYSTOLIC BLOOD PRESSURE: 128 MMHG | TEMPERATURE: 98.3 F | OXYGEN SATURATION: 95 % | HEART RATE: 100 BPM | WEIGHT: 123 LBS

## 2023-08-19 DIAGNOSIS — U07.1 COVID: ICD-10-CM

## 2023-08-19 LAB
FLUAV RNA SPEC QL NAA+PROBE: NEGATIVE
FLUBV RNA SPEC QL NAA+PROBE: NEGATIVE
RSV RNA SPEC QL NAA+PROBE: NEGATIVE
SARS-COV-2 RNA RESP QL NAA+PROBE: POSITIVE

## 2023-08-19 PROCEDURE — 3078F DIAST BP <80 MM HG: CPT

## 2023-08-19 PROCEDURE — 99213 OFFICE O/P EST LOW 20 MIN: CPT

## 2023-08-19 PROCEDURE — 3074F SYST BP LT 130 MM HG: CPT

## 2023-08-19 PROCEDURE — 0241U POCT CEPHEID COV-2, FLU A/B, RSV - PCR: CPT

## 2023-08-19 RX ORDER — BENZONATATE 100 MG/1
100 CAPSULE ORAL 3 TIMES DAILY PRN
Qty: 60 CAPSULE | Refills: 0 | Status: SHIPPED | OUTPATIENT
Start: 2023-08-19

## 2023-08-19 RX ORDER — BENZONATATE 100 MG/1
100 CAPSULE ORAL 3 TIMES DAILY PRN
Qty: 30 CAPSULE | Refills: 0 | Status: SHIPPED | OUTPATIENT
Start: 2023-08-19 | End: 2023-08-19

## 2023-08-20 NOTE — PROGRESS NOTES
Subjective:   Arin Lee is a 73 y.o. female who presents for Cough (X 5 days, cough, headache, heartburn, runny nose)      HPI: This is a 73-year-old female who presents today for cough, heartburn, runny nose, and headaches.  This is a new problem.  Patient reports symptoms developed 4 days ago.  She reports cough is dry and nonproductive.  She denies wheezing or shortness of breath.  No sick contacts.  She denies underlying history of asthma or COPD.  She reports low-grade fevers the first 2 days of symptoms which have now resolved.  She is taking Raya-Lowden, Robitussin, Tylenol without improvement in her symptoms.    Review of Systems   Constitutional:  Positive for malaise/fatigue. Negative for fever.   HENT:  Positive for congestion and sore throat.    Respiratory:  Positive for cough. Negative for sputum production, shortness of breath and wheezing.    Cardiovascular:  Negative for chest pain and palpitations.   Gastrointestinal:  Positive for heartburn.   Neurological:  Positive for headaches. Negative for dizziness.   All other systems reviewed and are negative.      Medications:    Current Outpatient Medications on File Prior to Visit   Medication Sig Dispense Refill    FARXIGA 10 MG Tab Take 1 Tablet by mouth every day. M- Fri      Semaglutide,0.25 or 0.5MG/DOS, (OZEMPIC, 0.25 OR 0.5 MG/DOSE,) 2 MG/3ML Solution Pen-injector Inject 0.5 mg under the skin every 7 days.      Canagliflozin (INVOKANA) 100 MG Tab Take  by mouth. M-Fri      albuterol 108 (90 Base) MCG/ACT Aero Soln inhalation aerosol Inhale 2 Puffs every 6 hours as needed for Shortness of Breath (cough). 8.5 g 0    benzonatate (TESSALON) 100 MG Cap Take 1 Capsule by mouth 3 times a day as needed for Cough. 60 Capsule 0    lisinopril (PRINIVIL) 10 MG Tab Take 10 mg by mouth every day.  3    glimepiride (AMARYL) 4 MG Tab Take 4 mg by mouth every morning.  3    metformin (GLUCOPHAGE) 1000 MG tablet Take 1,000 mg by mouth.  11     "rosuvastatin (CRESTOR) 5 MG Tab Take 5 mg by mouth every day.  3    SYNTHROID 112 MCG Tab       BINOSTO 70 MG Effer Tab       ACCU-CHEK FASTCLIX LANCETS Misc USE DAILY TO CHECK BLOOD SUGARS. E11.9  3    ACCU-CHEK COMPACT PLUS strip TEST BLOOD SUGAR ONCE DAILY E11.9  3    aspirin (ASA) 81 MG Chew Tab chewable tablet Take 81 mg by mouth every day.      liraglutide (VICTOZA) 18 MG/3ML Solution Pen-injector Inject 0.6 mg as instructed every day.      vitamin D (CHOLECALCIFEROL) 1000 UNIT Tab Take 1,000 Units by mouth every day.      Calcium Carbonate-Vit D-Min (CALCIUM 1200 PO) Take  by mouth.      NOVOTWIST 32G X 5 MM Misc USE DAILY WITH VICTOZA (Patient not taking: Reported on 11/24/2022)  6    benzonatate (TESSALON) 100 MG Cap Take 1 Cap by mouth 3 times a day as needed. (Patient not taking: Reported on 11/24/2022) 30 Cap 0    naproxen (NAPROSYN) 250 MG TABS Take 1 Tab by mouth 2 times daily with meals as needed. (Patient not taking: Reported on 10/20/2019) 30 Each 0     No current facility-administered medications on file prior to visit.        Allergies:   Patient has no known allergies.    Problem List:   There is no problem list on file for this patient.       Surgical History:  No past surgical history on file.    Past Social Hx:   Social History     Tobacco Use    Smoking status: Never    Smokeless tobacco: Never   Vaping Use    Vaping Use: Never used   Substance Use Topics    Alcohol use: Not Currently    Drug use: Not Currently          Problem list, medications, and allergies reviewed by myself today in Epic.     Objective:     /70   Pulse 100   Temp 36.8 °C (98.3 °F) (Temporal)   Resp 16   Ht 1.626 m (5' 4\")   Wt 55.8 kg (123 lb)   SpO2 95%   BMI 21.11 kg/m²     Physical Exam  Vitals and nursing note reviewed.   Constitutional:       General: She is not in acute distress.     Appearance: Normal appearance. She is normal weight. She is ill-appearing. She is not toxic-appearing or diaphoretic. "   HENT:      Head: Normocephalic and atraumatic.      Right Ear: Tympanic membrane, ear canal and external ear normal. There is no impacted cerumen.      Left Ear: Tympanic membrane, ear canal and external ear normal. There is no impacted cerumen.      Nose: Rhinorrhea present. No congestion.      Mouth/Throat:      Mouth: Mucous membranes are moist.      Pharynx: Oropharynx is clear. Posterior oropharyngeal erythema present. No oropharyngeal exudate.   Cardiovascular:      Rate and Rhythm: Normal rate and regular rhythm.      Pulses: Normal pulses.      Heart sounds: Normal heart sounds. No murmur heard.     No friction rub. No gallop.   Pulmonary:      Effort: Pulmonary effort is normal. No respiratory distress.      Breath sounds: Normal breath sounds. No stridor. No wheezing, rhonchi or rales.   Chest:      Chest wall: No tenderness.   Musculoskeletal:      Cervical back: Neck supple. No tenderness.   Lymphadenopathy:      Cervical: No cervical adenopathy.   Skin:     General: Skin is warm and dry.      Capillary Refill: Capillary refill takes less than 2 seconds.   Neurological:      General: No focal deficit present.      Mental Status: She is alert and oriented to person, place, and time. Mental status is at baseline.      Cranial Nerves: No cranial nerve deficit.      Motor: No weakness.      Gait: Gait normal.   Psychiatric:         Mood and Affect: Mood normal.         Behavior: Behavior normal.         Thought Content: Thought content normal.         Judgment: Judgment normal.         Assessment/Plan:     Diagnosis and associated orders:   1. COVID  POCT CoV-2, Flu A/B, RSV by PCR    Nirmatrelvir&Ritonavir 300/100 20 x 150 MG & 10 x 100MG Tablet Therapy Pack    benzonatate (TESSALON) 100 MG Cap    DISCONTINUED: benzonatate (TESSALON) 100 MG Cap    DISCONTINUED: Nirmatrelvir&Ritonavir 300/100 20 x 150 MG & 10 x 100MG Tablet Therapy Pack         Results for orders placed or performed in visit on 08/19/23    POCT CoV-2, Flu A/B, RSV by PCR   Result Value Ref Range    SARS-CoV-2 by PCR Positive (A) Negative, Invalid    Influenza virus A RNA Negative Negative, Invalid    Influenza virus B, PCR Negative Negative, Invalid    RSV, PCR Negative Negative, Invalid          Comments/MDM:   Pt is clinically stable at today's acute urgent care visit.  No acute distress noted. Appropriate for outpatient management at this time.     Acute problem.  Vital signs are stable in clinic today, SPO2 95% on room air, lung sounds clear to auscultation, nonlabored breathing.  COVID testing is positive.  These results were called to patient.  Discussed antiviral therapy Paxlovid with patient.  Patient would like to move forward with antiviral as she has been on this in the past.  Discussed potential medication reactions, side effects, and emergency use of this medication with patient.  Advised patient that she must hold her statin medications while taking antiviral.  Discussed staying well-hydrated and resting.  Strict ER precautions for any worsening in her signs or symptoms. Patient is to return to  or go to ER for any new or worsening signs or symptoms, and follow with with PCP for recheck. Patient is agreeable with plan of care and verbalizes good understanding.             Discussed DDx, management options (risks,benefits, and alternatives to planned treatment), natural progression and supportive care.  Expressed understanding and the treatment plan was agreed upon. Questions were encouraged and answered   Return to urgent care prn if new or worsening sx or if there is no improvement in condition prn.    Educated in Red flags and indications to immediately call 911 or present to the Emergency Department.   Advised the patient to follow-up with the primary care physician for recheck, reevaluation, and consideration of further management.    I personally reviewed prior external notes and test results pertinent to today's visit.  I have  independently reviewed and interpreted all diagnostics ordered during this urgent care acute visit.     Please note that this dictation was created using voice recognition software. I have made a reasonable attempt to correct obvious errors, but I expect that there are errors of grammar and possibly content that I did not discover before finalizing the note.    This note was electronically signed by BIBI Alberts

## 2023-08-21 ASSESSMENT — ENCOUNTER SYMPTOMS
SPUTUM PRODUCTION: 0
HEARTBURN: 1
PALPITATIONS: 0
COUGH: 1
WHEEZING: 0
SHORTNESS OF BREATH: 0
SORE THROAT: 1
HEADACHES: 1
FEVER: 0
DIZZINESS: 0

## 2024-06-17 ENCOUNTER — HOSPITAL ENCOUNTER (OUTPATIENT)
Dept: RADIOLOGY | Facility: MEDICAL CENTER | Age: 74
End: 2024-06-17
Attending: OBSTETRICS & GYNECOLOGY
Payer: MEDICARE

## 2024-06-17 DIAGNOSIS — Z12.31 SCREENING MAMMOGRAM FOR BREAST CANCER: ICD-10-CM

## 2024-06-17 PROCEDURE — 77063 BREAST TOMOSYNTHESIS BI: CPT

## 2024-09-21 ENCOUNTER — OFFICE VISIT (OUTPATIENT)
Dept: URGENT CARE | Facility: CLINIC | Age: 74
End: 2024-09-21
Payer: MEDICARE

## 2024-09-21 VITALS
HEART RATE: 94 BPM | WEIGHT: 128 LBS | SYSTOLIC BLOOD PRESSURE: 120 MMHG | DIASTOLIC BLOOD PRESSURE: 68 MMHG | TEMPERATURE: 98.2 F | OXYGEN SATURATION: 99 % | RESPIRATION RATE: 30 BRPM | HEIGHT: 64 IN | BODY MASS INDEX: 21.85 KG/M2

## 2024-09-21 DIAGNOSIS — R05.1 ACUTE COUGH: ICD-10-CM

## 2024-09-21 DIAGNOSIS — J10.1 INFLUENZA A: ICD-10-CM

## 2024-09-21 LAB
FLUAV RNA SPEC QL NAA+PROBE: POSITIVE
FLUBV RNA SPEC QL NAA+PROBE: NEGATIVE
RSV RNA SPEC QL NAA+PROBE: NEGATIVE
SARS-COV-2 RNA RESP QL NAA+PROBE: NEGATIVE

## 2024-09-21 PROCEDURE — 0241U POCT CEPHEID COV-2, FLU A/B, RSV - PCR: CPT | Performed by: FAMILY MEDICINE

## 2024-09-21 PROCEDURE — 3078F DIAST BP <80 MM HG: CPT | Performed by: FAMILY MEDICINE

## 2024-09-21 PROCEDURE — 99213 OFFICE O/P EST LOW 20 MIN: CPT | Performed by: FAMILY MEDICINE

## 2024-09-21 PROCEDURE — 3074F SYST BP LT 130 MM HG: CPT | Performed by: FAMILY MEDICINE

## 2024-09-21 RX ORDER — BENZONATATE 100 MG/1
100 CAPSULE ORAL 3 TIMES DAILY PRN
Qty: 30 CAPSULE | Refills: 0 | Status: SHIPPED | OUTPATIENT
Start: 2024-09-21

## 2024-09-21 ASSESSMENT — ENCOUNTER SYMPTOMS
WEIGHT LOSS: 0
NAUSEA: 0
EYE REDNESS: 0
MYALGIAS: 0
VOMITING: 0
EYE DISCHARGE: 0

## 2024-09-21 NOTE — PROGRESS NOTES
"Subjective     Arin Lee is a 74 y.o. female who presents with Cough (X4 days: Cough, runny nose, ache)            4 days productive cough without blood in sputum.  Nasal congestion.  Headache and body ache.  No shortness of breath or wheezing.  No PMH asthma or COPD.  +PMH pneumonia.  No other aggravating alleviating factors.        Review of Systems   Constitutional:  Negative for malaise/fatigue and weight loss.   Eyes:  Negative for discharge and redness.   Gastrointestinal:  Negative for nausea and vomiting.   Musculoskeletal:  Negative for joint pain and myalgias.   Skin:  Negative for itching and rash.              Objective     /68   Pulse 94   Temp 36.8 °C (98.2 °F)   Resp (!) 30   Ht 1.626 m (5' 4\")   Wt 58.1 kg (128 lb)   SpO2 99%   BMI 21.97 kg/m²      Physical Exam  Constitutional:       General: She is not in acute distress.     Appearance: She is well-developed.   HENT:      Head: Normocephalic and atraumatic.      Right Ear: Tympanic membrane normal.      Left Ear: Tympanic membrane normal.      Nose: Congestion and rhinorrhea present.      Mouth/Throat:      Mouth: Mucous membranes are moist.      Pharynx: No posterior oropharyngeal erythema.   Eyes:      Conjunctiva/sclera: Conjunctivae normal.   Cardiovascular:      Rate and Rhythm: Normal rate and regular rhythm.      Heart sounds: Normal heart sounds. No murmur heard.  Pulmonary:      Effort: Pulmonary effort is normal.      Breath sounds: Normal breath sounds. No wheezing.   Skin:     General: Skin is warm and dry.      Findings: No rash.   Neurological:      Mental Status: She is alert.                             Assessment & Plan      Poct pcr influenza A+  1. Acute cough  POCT CEPHEID COV-2, FLU A/B, RSV - PCR    benzonatate (TESSALON PERLES) 100 MG Cap      2. Influenza A          Differential diagnosis, natural history, supportive care, and indications for immediate follow-up were discussed.         "

## 2024-12-29 ENCOUNTER — OFFICE VISIT (OUTPATIENT)
Dept: URGENT CARE | Facility: CLINIC | Age: 74
End: 2024-12-29
Payer: MEDICARE

## 2024-12-29 VITALS
OXYGEN SATURATION: 98 % | TEMPERATURE: 98.9 F | HEART RATE: 86 BPM | SYSTOLIC BLOOD PRESSURE: 128 MMHG | WEIGHT: 133.3 LBS | RESPIRATION RATE: 16 BRPM | BODY MASS INDEX: 22.76 KG/M2 | DIASTOLIC BLOOD PRESSURE: 66 MMHG | HEIGHT: 64 IN

## 2024-12-29 DIAGNOSIS — J06.9 VIRAL URI WITH COUGH: ICD-10-CM

## 2024-12-29 DIAGNOSIS — R51.9 ACUTE NONINTRACTABLE HEADACHE, UNSPECIFIED HEADACHE TYPE: ICD-10-CM

## 2024-12-29 LAB
FLUAV RNA SPEC QL NAA+PROBE: NEGATIVE
FLUBV RNA SPEC QL NAA+PROBE: NEGATIVE
RSV RNA SPEC QL NAA+PROBE: NEGATIVE
SARS-COV-2 RNA RESP QL NAA+PROBE: NEGATIVE

## 2024-12-29 RX ORDER — PIOGLITAZONE 30 MG/1
30 TABLET ORAL
COMMUNITY
Start: 2024-11-10

## 2024-12-29 RX ORDER — MELOXICAM 7.5 MG/1
7.5 TABLET ORAL DAILY
Qty: 10 TABLET | Refills: 0 | Status: SHIPPED | OUTPATIENT
Start: 2024-12-29

## 2024-12-29 RX ORDER — SEMAGLUTIDE 1.34 MG/ML
INJECTION, SOLUTION SUBCUTANEOUS
COMMUNITY
Start: 2024-11-19

## 2024-12-29 ASSESSMENT — ENCOUNTER SYMPTOMS: HEADACHES: 1

## 2024-12-29 NOTE — PROGRESS NOTES
Subjective:     Arin Lee is a 74 y.o. female who presents for Cough (X3 days), Headache, Runny Nose, and Pharyngitis (X3 days)    HPI  Pt presents for evaluation of an acute problem  Pt with an illness the past 3 days   Had sore throat and rhinorrhea the first day   Minimal cough   Has some ear pain   Headaches are moderate   Feeling fatigued   No myalgias   No fevers     Review of Systems   Neurological:  Positive for headaches.       PMH:  has no past medical history of Breast cancer (HCC).  MEDS:   Current Outpatient Medications:     pioglitazone (ACTOS) 30 MG Tab, Take 30 mg by mouth every day., Disp: , Rfl:     OZEMPIC, 1 MG/DOSE, 4 MG/3ML Solution Pen-injector, INJECT 1MG SUBCUTANEOUSLY  WEEKLY, Disp: , Rfl:     meloxicam (MOBIC) 7.5 MG Tab, Take 1 Tablet by mouth every day., Disp: 10 Tablet, Rfl: 0    FARXIGA 10 MG Tab, Take 1 Tablet by mouth every day. M- Fri, Disp: , Rfl:     albuterol 108 (90 Base) MCG/ACT Aero Soln inhalation aerosol, Inhale 2 Puffs every 6 hours as needed for Shortness of Breath (cough)., Disp: 8.5 g, Rfl: 0    metformin (GLUCOPHAGE) 1000 MG tablet, Take 1,000 mg by mouth., Disp: , Rfl: 11    rosuvastatin (CRESTOR) 5 MG Tab, Take 5 mg by mouth every day., Disp: , Rfl: 3    SYNTHROID 112 MCG Tab, , Disp: , Rfl:     BINOSTO 70 MG Effer Tab, , Disp: , Rfl:     ACCU-CHEK FASTCLIX LANCETS [facility], USE DAILY TO CHECK BLOOD SUGARS. E11.9, Disp: , Rfl: 3    ACCU-CHEK COMPACT PLUS strip, TEST BLOOD SUGAR ONCE DAILY E11.9, Disp: , Rfl: 3    aspirin (ASA) 81 MG Chew Tab chewable tablet, Take 81 mg by mouth every day., Disp: , Rfl:     vitamin D (CHOLECALCIFEROL) 1000 UNIT Tab, Take 1,000 Units by mouth every day., Disp: , Rfl:     Calcium Carbonate-Vit D-Min (CALCIUM 1200 PO), Take  by mouth., Disp: , Rfl:   ALLERGIES: No Known Allergies  SURGHX: History reviewed. No pertinent surgical history.  SOCHX:  reports that she has never smoked. She has never used smokeless tobacco. She reports  "that she does not currently use alcohol. She reports that she does not currently use drugs.     Objective:   /66 (BP Location: Left arm, Patient Position: Sitting, BP Cuff Size: Adult)   Pulse 86   Temp 37.2 °C (98.9 °F)   Resp 16   Ht 1.626 m (5' 4\")   Wt 60.5 kg (133 lb 4.8 oz)   SpO2 98%   BMI 22.88 kg/m²     Physical Exam  Constitutional:       General: She is not in acute distress.     Appearance: She is well-developed. She is not diaphoretic.   HENT:      Head: Normocephalic and atraumatic.      Right Ear: Tympanic membrane, ear canal and external ear normal.      Left Ear: Tympanic membrane, ear canal and external ear normal.      Nose: Congestion present.      Mouth/Throat:      Mouth: Mucous membranes are moist.      Pharynx: Oropharynx is clear. No oropharyngeal exudate or posterior oropharyngeal erythema.   Neck:      Trachea: No tracheal deviation.   Cardiovascular:      Rate and Rhythm: Normal rate and regular rhythm.   Pulmonary:      Effort: Pulmonary effort is normal. No respiratory distress.      Breath sounds: Normal breath sounds. No wheezing or rales.   Musculoskeletal:      Cervical back: Normal range of motion and neck supple. No tenderness.   Lymphadenopathy:      Cervical: No cervical adenopathy.   Skin:     General: Skin is warm and dry.      Findings: No rash.   Neurological:      Mental Status: She is alert.         Assessment/Plan:   Assessment    1. Viral URI with cough  - POCT CEPHEID COV-2, FLU A/B, RSV - PCR  - meloxicam (MOBIC) 7.5 MG Tab; Take 1 Tablet by mouth every day.  Dispense: 10 Tablet; Refill: 0    2. Acute nonintractable headache, unspecified headache type  - meloxicam (MOBIC) 7.5 MG Tab; Take 1 Tablet by mouth every day.  Dispense: 10 Tablet; Refill: 0    Other orders  - pioglitazone (ACTOS) 30 MG Tab; Take 30 mg by mouth every day.  - OZEMPIC, 1 MG/DOSE, 4 MG/3ML Solution Pen-injector; INJECT 1MG SUBCUTANEOUSLY  WEEKLY    Patient with viral URI.  Main symptom " is headache and cough is not too bad right now.  Recommended treatment with NSAIDs and reviewed other supportive care measures.  Point-of-care COVID/influenza/RSV all negative.  All questions answered and will follow-up in the urgent care on an as-needed basis.

## 2025-04-29 ENCOUNTER — APPOINTMENT (OUTPATIENT)
Dept: RADIOLOGY | Facility: IMAGING CENTER | Age: 75
End: 2025-04-29
Attending: NURSE PRACTITIONER
Payer: MEDICARE

## 2025-04-29 ENCOUNTER — OFFICE VISIT (OUTPATIENT)
Dept: URGENT CARE | Facility: CLINIC | Age: 75
End: 2025-04-29
Payer: MEDICARE

## 2025-04-29 VITALS
HEART RATE: 111 BPM | BODY MASS INDEX: 22.83 KG/M2 | SYSTOLIC BLOOD PRESSURE: 138 MMHG | DIASTOLIC BLOOD PRESSURE: 78 MMHG | OXYGEN SATURATION: 93 % | WEIGHT: 133 LBS | TEMPERATURE: 100.8 F | RESPIRATION RATE: 20 BRPM

## 2025-04-29 DIAGNOSIS — J06.9 VIRAL URI: ICD-10-CM

## 2025-04-29 PROCEDURE — 99213 OFFICE O/P EST LOW 20 MIN: CPT | Performed by: NURSE PRACTITIONER

## 2025-04-29 PROCEDURE — 0241U POCT CEPHEID COV-2, FLU A/B, RSV - PCR: CPT | Performed by: NURSE PRACTITIONER

## 2025-04-29 PROCEDURE — 71046 X-RAY EXAM CHEST 2 VIEWS: CPT | Mod: TC | Performed by: STUDENT IN AN ORGANIZED HEALTH CARE EDUCATION/TRAINING PROGRAM

## 2025-04-29 RX ORDER — ALBUTEROL SULFATE 90 UG/1
2 INHALANT RESPIRATORY (INHALATION) EVERY 6 HOURS PRN
Qty: 8.5 G | Refills: 0 | Status: SHIPPED | OUTPATIENT
Start: 2025-04-29

## 2025-04-29 RX ORDER — BENZONATATE 100 MG/1
100 CAPSULE ORAL 3 TIMES DAILY PRN
Qty: 60 CAPSULE | Refills: 0 | Status: SHIPPED | OUTPATIENT
Start: 2025-04-29

## 2025-04-29 ASSESSMENT — ENCOUNTER SYMPTOMS
MYALGIAS: 0
FEVER: 1
NAUSEA: 0
DIZZINESS: 0
CHILLS: 1
RHINORRHEA: 1
VOMITING: 0
SORE THROAT: 0
SHORTNESS OF BREATH: 0
SINUS PAIN: 0
EYE REDNESS: 0
COUGH: 1

## 2025-04-30 NOTE — PROGRESS NOTES
Subjective:   Arin Lee is a 74 y.o. female who presents for Cough (COUGH, RUNNY NOSE X 3 DAYS)      URI   This is a new problem. Episode onset: 3 days. The maximum temperature recorded prior to her arrival was 100.4 - 100.9 F. Associated symptoms include congestion, coughing and rhinorrhea. Pertinent negatives include no chest pain, dysuria, nausea, rash, sinus pain, sore throat or vomiting. She has tried acetaminophen for the symptoms.       Review of Systems   Constitutional:  Positive for chills, fever and malaise/fatigue.   HENT:  Positive for congestion and rhinorrhea. Negative for sinus pain and sore throat.    Eyes:  Negative for redness.   Respiratory:  Positive for cough. Negative for shortness of breath.    Cardiovascular:  Negative for chest pain.   Gastrointestinal:  Negative for nausea and vomiting.   Genitourinary:  Negative for dysuria.   Musculoskeletal:  Negative for myalgias.   Skin:  Negative for rash.   Neurological:  Negative for dizziness.       Medications:    Accu-Chek Compact Plus Strp  Accu-Chek FastClix Lancets Misc  albuterol Aers  aspirin Chew  Binosto Tbef  CALCIUM 1200 PO  Farxiga Tabs  meloxicam Tabs  metformin  Ozempic (1 MG/DOSE) Sopn  pioglitazone Tabs  rosuvastatin Tabs  Synthroid Tabs  vitamin D Tabs    Allergies: Patient has no known allergies.    Problem List: Arin Lee does not have a problem list on file.    Surgical History:  No past surgical history on file.    Past Social Hx: Arin Lee  reports that she has never smoked. She has never used smokeless tobacco. She reports that she does not currently use alcohol. She reports that she does not currently use drugs.     Past Family Hx:  Arin Lee family history includes Breast Cancer in her sister; Cancer in her sister.     Problem list, medications, and allergies reviewed by myself today in Epic.     Objective:     /78   Pulse (!) 111   Temp (!) 38.2 °C (100.8 °F)  (Temporal)   Resp 20   Wt 60.3 kg (133 lb)   SpO2 93%   BMI 22.83 kg/m²     Physical Exam  Vitals and nursing note reviewed.   Constitutional:       General: She is not in acute distress.     Appearance: She is well-developed.   HENT:      Head: Normocephalic and atraumatic.      Right Ear: Tympanic membrane and external ear normal.      Left Ear: Tympanic membrane and external ear normal.      Nose: Nose normal.      Right Sinus: No maxillary sinus tenderness or frontal sinus tenderness.      Left Sinus: No maxillary sinus tenderness or frontal sinus tenderness.      Mouth/Throat:      Mouth: Mucous membranes are moist.      Pharynx: Uvula midline. No posterior oropharyngeal erythema.      Tonsils: No tonsillar exudate or tonsillar abscesses.   Eyes:      General:         Right eye: No discharge.         Left eye: No discharge.      Conjunctiva/sclera: Conjunctivae normal.   Cardiovascular:      Rate and Rhythm: Tachycardia present.      Heart sounds: Normal heart sounds, S1 normal and S2 normal.   Pulmonary:      Effort: Pulmonary effort is normal. No respiratory distress.      Breath sounds: Normal breath sounds.   Abdominal:      General: There is no distension.   Musculoskeletal:         General: Normal range of motion.   Skin:     General: Skin is warm and dry.   Neurological:      General: No focal deficit present.      Mental Status: She is alert and oriented to person, place, and time. Mental status is at baseline.      Gait: Gait (gait at baseline) normal.   Psychiatric:         Judgment: Judgment normal.         Assessment/Plan:     Diagnosis and associated orders:     1. Viral URI  DX-CHEST-2 VIEWS    POCT CoV-2, Flu A/B, RSV by PCR    benzonatate (TESSALON) 100 MG Cap    albuterol 108 (90 Base) MCG/ACT Aero Soln inhalation aerosol             Comments/MDM:     I independently reviewed the patient's imaging and agree with the interpretation of the radiologist.    1.  No overt failure or  pneumonia.  2.  Calcifications project at the peripheral right lower and midlung without clear correlate on lateral view. These may represent pleural calcifications.  3.  Bilateral lower lung nodular opacities favoring nipple shadow. If there is clinical concern, exam can be repeated with nipple markers.      I personally reviewed prior external notes and prior test results pertinent to today's visit.   Discussed management options, risks and benefits, and alternatives to treatment plan agreed upon.   Red flags discussed and indications to immediately call 911 or present to the Emergency Department.   Supportive care, differential diagnoses, and indications for immediate follow-up discussed with patient.    Patient expresses understanding and agrees to plan. Patient denies any other questions or concerns.                Please note that this dictation was created using voice recognition software. I have made a reasonable attempt to correct obvious errors, but I expect that there are errors of grammar and possibly content that I did not discover before finalizing the note.    This note was electronically signed by Cortez MTZ.

## 2025-07-22 ENCOUNTER — HOSPITAL ENCOUNTER (OUTPATIENT)
Dept: RADIOLOGY | Facility: MEDICAL CENTER | Age: 75
End: 2025-07-22
Attending: OBSTETRICS & GYNECOLOGY
Payer: MEDICARE

## 2025-07-22 DIAGNOSIS — M81.0 SENILE OSTEOPOROSIS: ICD-10-CM

## 2025-07-22 DIAGNOSIS — Z87.39 HISTORY OF OSTEOPOROSIS: ICD-10-CM

## 2025-07-22 DIAGNOSIS — R92.30 DENSE BREAST TISSUE: ICD-10-CM

## 2025-07-22 DIAGNOSIS — Z12.31 ENCOUNTER FOR SCREENING MAMMOGRAM FOR MALIGNANT NEOPLASM OF BREAST: ICD-10-CM

## 2025-07-22 DIAGNOSIS — R92.333 HETEROGENEOUSLY DENSE TISSUE OF BOTH BREASTS ON MAMMOGRAPHY: ICD-10-CM

## 2025-07-22 DIAGNOSIS — Z13.820 OSTEOPOROSIS SCREENING: ICD-10-CM

## 2025-07-22 PROCEDURE — 77063 BREAST TOMOSYNTHESIS BI: CPT

## 2025-07-22 PROCEDURE — 77080 DXA BONE DENSITY AXIAL: CPT

## 2025-07-22 PROCEDURE — 76641 ULTRASOUND BREAST COMPLETE: CPT
